# Patient Record
Sex: MALE | Race: WHITE
[De-identification: names, ages, dates, MRNs, and addresses within clinical notes are randomized per-mention and may not be internally consistent; named-entity substitution may affect disease eponyms.]

---

## 2017-10-03 ENCOUNTER — APPOINTMENT (OUTPATIENT)
Dept: ORTHOPEDIC SURGERY | Facility: CLINIC | Age: 62
End: 2017-10-03
Payer: COMMERCIAL

## 2017-10-03 VITALS — WEIGHT: 265 LBS | HEIGHT: 70 IN | BODY MASS INDEX: 37.94 KG/M2

## 2017-10-03 DIAGNOSIS — Z86.79 PERSONAL HISTORY OF OTHER DISEASES OF THE CIRCULATORY SYSTEM: ICD-10-CM

## 2017-10-03 DIAGNOSIS — Z78.9 OTHER SPECIFIED HEALTH STATUS: ICD-10-CM

## 2017-10-03 DIAGNOSIS — Z82.61 FAMILY HISTORY OF ARTHRITIS: ICD-10-CM

## 2017-10-03 DIAGNOSIS — Z86.69 PERSONAL HISTORY OF OTHER DISEASES OF THE NERVOUS SYSTEM AND SENSE ORGANS: ICD-10-CM

## 2017-10-03 DIAGNOSIS — Z86.39 PERSONAL HISTORY OF OTHER ENDOCRINE, NUTRITIONAL AND METABOLIC DISEASE: ICD-10-CM

## 2017-10-03 PROCEDURE — 20550 NJX 1 TENDON SHEATH/LIGAMENT: CPT | Mod: RT

## 2017-10-03 PROCEDURE — 99203 OFFICE O/P NEW LOW 30 MIN: CPT | Mod: 25

## 2020-08-04 ENCOUNTER — APPOINTMENT (OUTPATIENT)
Dept: ORTHOPEDIC SURGERY | Facility: CLINIC | Age: 65
End: 2020-08-04
Payer: MEDICARE

## 2020-08-04 PROCEDURE — 99214 OFFICE O/P EST MOD 30 MIN: CPT | Mod: 25

## 2020-08-04 PROCEDURE — 20550 NJX 1 TENDON SHEATH/LIGAMENT: CPT | Mod: F7

## 2021-07-27 ENCOUNTER — APPOINTMENT (OUTPATIENT)
Dept: ORTHOPEDIC SURGERY | Facility: CLINIC | Age: 66
End: 2021-07-27
Payer: MEDICARE

## 2021-07-27 ENCOUNTER — NON-APPOINTMENT (OUTPATIENT)
Age: 66
End: 2021-07-27

## 2021-07-27 VITALS — WEIGHT: 266 LBS | BODY MASS INDEX: 38.08 KG/M2 | HEIGHT: 70 IN

## 2021-07-27 PROCEDURE — 99214 OFFICE O/P EST MOD 30 MIN: CPT

## 2021-07-29 ENCOUNTER — APPOINTMENT (OUTPATIENT)
Dept: ORTHOPEDIC SURGERY | Facility: CLINIC | Age: 66
End: 2021-07-29
Payer: MEDICARE

## 2021-07-29 VITALS
BODY MASS INDEX: 37.94 KG/M2 | HEART RATE: 88 BPM | DIASTOLIC BLOOD PRESSURE: 72 MMHG | HEIGHT: 70 IN | SYSTOLIC BLOOD PRESSURE: 126 MMHG | OXYGEN SATURATION: 96 % | WEIGHT: 265 LBS

## 2021-07-29 PROCEDURE — 26055 INCISE FINGER TENDON SHEATH: CPT | Mod: F2

## 2021-08-05 ENCOUNTER — APPOINTMENT (OUTPATIENT)
Dept: ORTHOPEDIC SURGERY | Facility: CLINIC | Age: 66
End: 2021-08-05
Payer: MEDICARE

## 2021-08-05 VITALS — BODY MASS INDEX: 37.94 KG/M2 | WEIGHT: 265 LBS | HEIGHT: 70 IN

## 2021-08-05 PROCEDURE — 99024 POSTOP FOLLOW-UP VISIT: CPT

## 2021-08-24 ENCOUNTER — APPOINTMENT (OUTPATIENT)
Dept: ORTHOPEDIC SURGERY | Facility: CLINIC | Age: 66
End: 2021-08-24
Payer: MEDICARE

## 2021-08-24 DIAGNOSIS — M65.332 TRIGGER FINGER, LEFT MIDDLE FINGER: ICD-10-CM

## 2021-08-24 PROCEDURE — 99024 POSTOP FOLLOW-UP VISIT: CPT

## 2021-08-25 NOTE — PROCEDURE
[de-identified] : ASSISTANT:  JERO Bonilla\par \par PREOPERATIVE DIAGNOSES: Left Ring Finger Trigger, Right Middle and Ring Finger Triggers\par \par OPERATION:  Left Ring Finger Trigger Release, Right Middle and Ring Trigger Releases\par \par POSTOPERATIVE DIAGNOSES:   Left Ring Finger Trigger, Right Middle and Ring Finger Triggers\par \par ANESTHESIA: Local .\par \par ESTIMATED BLOOD LOSS: Minimal.\par \par COMPLICATIONS:None.\par \par INDICATIONS: Mr. Tinoco is a pleasant 65-year-old gentleman who has multiple triggers that has now failed conservative management of steroid injections, anti-inflammatories and activity modifications.  He is scheduled for knee replacement in a few weeks and is concerned that he is going to be unable to  a walker or cane with his current triggering.  \par \par The risks, benefits, and alternatives  to surgery were discussed with the patient and they agreed to proceed.  Written consent was obtained. 20cc of buffered 1% lidocaine with epinephrine was instilled into the operative areas for local anesthetic and hemostatic control.   This was allowed to take effect for 25 minutes.\par \par PROCEDURE:  The patient was taken to the procedure room.  A confirmation of planned operative procedure and operative site was performed.  The arm was prepped and draped in a standard sterile fashion with Chloroprep.  \par \par We began by marking a 1 cm longitudinal incision over the A1 pulley of the left ring finger.  This was brought sharply down through the skin and subcutaneous tissues.  2 Ragnell retractors were used to clear the overlying soft tissue from the A1 pulley.  Once the A1 pulley was isolated, the central portion was incised with a knife.  The release was continued proximally and distally with Castelan scissors.  The adhesions between the FDS and FDP tendon were then broken up by the tendons out of the incision.  The patient was then asked to flex and extend the finger with no residual triggering. \par \par We then turned our attention to the right ring finger by marking a 1 cm incision in line with the A1 pulley of the ring finger.  This was brought sharply down through the skin and subcutaneous tissues.  2 Ragnell retractors were used to clear the overlying soft tissue from the A1 pulley.  Once the A1 pulley was isolated, the central portion was incised with a knife.  The release was continued proximally and distally with Castelan scissors.  The adhesions between the FDS and FDP tendon were then broken up by the tendons out of the incision.  The patient was then asked to flex and extend the finger with no residual triggering..\par \par We then turned our attention to the right middle finger by marking a 1 cm incision in line with the A1 pulley of the middle finger.  This was brought sharply down through the skin and subcutaneous tissues.  2 Ragnell retractors were used to clear the overlying soft tissue from the A1 pulley.  Once the A1 pulley was isolated, the central portion was incised with a knife.  The release was continued proximally and distally with Castelan scissors.  The adhesions between the FDS and FDP tendon were then broken up by the tendons out of the incision.  The patient was then asked to flex and extend the finger with no residual triggering..\par \par  The incisions were all irrigated with normal saline.  They were closed with 5-0 Monocryl subcuticular stitch.  The incisions were dressed with Dermabond, Steri-Strips, gauze and Coban.  The patient tolerated the procedure well.  They were asked to sit up and had no apparent complications prior to discharge.\par \par POSTOPERATIVE PLAN:  The patient will remain in the dressing for 3 days alternating Tylenol and ibuprofen for post-procedure pain control.  A sheet explaining postoperative finger exercises was given to the patient to prevent stiffness.  They be able to remove the dressing after 3 days and take regular showers and wash hands.  They should not soak the area.  I will see them back in 10 to 14 days for an incision check.\par

## 2021-10-05 ENCOUNTER — APPOINTMENT (OUTPATIENT)
Dept: ORTHOPEDIC SURGERY | Facility: CLINIC | Age: 66
End: 2021-10-05
Payer: MEDICARE

## 2021-10-05 PROCEDURE — 99024 POSTOP FOLLOW-UP VISIT: CPT

## 2021-11-23 ENCOUNTER — APPOINTMENT (OUTPATIENT)
Dept: ORTHOPEDIC SURGERY | Facility: CLINIC | Age: 66
End: 2021-11-23
Payer: MEDICARE

## 2021-11-23 VITALS — HEIGHT: 70 IN | BODY MASS INDEX: 37.94 KG/M2 | WEIGHT: 265 LBS

## 2021-11-23 DIAGNOSIS — M65.331 TRIGGER FINGER, RIGHT MIDDLE FINGER: ICD-10-CM

## 2021-11-23 DIAGNOSIS — M65.342 TRIGGER FINGER, LEFT RING FINGER: ICD-10-CM

## 2021-11-23 DIAGNOSIS — M65.341 TRIGGER FINGER, RIGHT RING FINGER: ICD-10-CM

## 2021-11-23 PROCEDURE — 99214 OFFICE O/P EST MOD 30 MIN: CPT

## 2023-07-27 ENCOUNTER — APPOINTMENT (OUTPATIENT)
Dept: ORTHOPEDIC SURGERY | Facility: CLINIC | Age: 68
End: 2023-07-27
Payer: MEDICARE

## 2023-07-27 PROCEDURE — 20550 NJX 1 TENDON SHEATH/LIGAMENT: CPT | Mod: RT

## 2023-07-27 PROCEDURE — 99214 OFFICE O/P EST MOD 30 MIN: CPT | Mod: 25

## 2024-06-27 ENCOUNTER — APPOINTMENT (OUTPATIENT)
Dept: ORTHOPEDIC SURGERY | Facility: CLINIC | Age: 69
End: 2024-06-27

## 2024-06-27 DIAGNOSIS — M65.351 TRIGGER FINGER, RIGHT LITTLE FINGER: ICD-10-CM

## 2024-06-27 PROCEDURE — 99214 OFFICE O/P EST MOD 30 MIN: CPT | Mod: 25

## 2024-06-27 PROCEDURE — 20550 NJX 1 TENDON SHEATH/LIGAMENT: CPT | Mod: RT
